# Patient Record
Sex: FEMALE | URBAN - METROPOLITAN AREA
[De-identification: names, ages, dates, MRNs, and addresses within clinical notes are randomized per-mention and may not be internally consistent; named-entity substitution may affect disease eponyms.]

---

## 2022-09-28 ENCOUNTER — OUT OF OFFICE VISIT (OUTPATIENT)
Dept: URBAN - METROPOLITAN AREA MEDICAL CENTER 8 | Facility: MEDICAL CENTER | Age: 77
End: 2022-09-28
Payer: COMMERCIAL

## 2022-09-28 DIAGNOSIS — K92.1 ACUTE MELENA: ICD-10-CM

## 2022-09-28 DIAGNOSIS — K31.89 ACQUIRED DEFORMITY OF DUODENUM: ICD-10-CM

## 2022-09-28 DIAGNOSIS — K22.89 DILATATION OF ESOPHAGUS: ICD-10-CM

## 2022-09-28 DIAGNOSIS — D62 ABLA (ACUTE BLOOD LOSS ANEMIA): ICD-10-CM

## 2022-09-28 DIAGNOSIS — D64.89 ANEMIA DUE TO OTHER CAUSE: ICD-10-CM

## 2022-09-28 PROCEDURE — 99222 1ST HOSP IP/OBS MODERATE 55: CPT | Performed by: INTERNAL MEDICINE

## 2022-09-28 PROCEDURE — 99232 SBSQ HOSP IP/OBS MODERATE 35: CPT | Performed by: INTERNAL MEDICINE

## 2022-09-28 PROCEDURE — G8427 DOCREV CUR MEDS BY ELIG CLIN: HCPCS | Performed by: INTERNAL MEDICINE

## 2022-09-28 PROCEDURE — 43239 EGD BIOPSY SINGLE/MULTIPLE: CPT | Performed by: INTERNAL MEDICINE

## 2023-02-27 ENCOUNTER — OUT OF OFFICE VISIT (OUTPATIENT)
Dept: URBAN - METROPOLITAN AREA MEDICAL CENTER 8 | Facility: MEDICAL CENTER | Age: 78
End: 2023-02-27

## 2023-02-27 ENCOUNTER — CLAIMS CREATED FROM THE CLAIM WINDOW (OUTPATIENT)
Dept: URBAN - METROPOLITAN AREA MEDICAL CENTER 8 | Facility: MEDICAL CENTER | Age: 78
End: 2023-02-27
Payer: COMMERCIAL

## 2023-02-27 DIAGNOSIS — R41.0 ACUTE CONFUSION: ICD-10-CM

## 2023-02-27 DIAGNOSIS — R18.8 ABDOMINAL ASCITES: ICD-10-CM

## 2023-02-27 DIAGNOSIS — K92.1 ACUTE MELENA: ICD-10-CM

## 2023-02-27 DIAGNOSIS — K74.60 ADVANCED CIRRHOSIS: ICD-10-CM

## 2023-02-27 PROCEDURE — 99232 SBSQ HOSP IP/OBS MODERATE 35: CPT | Performed by: INTERNAL MEDICINE

## 2023-03-02 ENCOUNTER — CLAIMS CREATED FROM THE CLAIM WINDOW (OUTPATIENT)
Dept: URBAN - METROPOLITAN AREA MEDICAL CENTER 8 | Facility: MEDICAL CENTER | Age: 78
End: 2023-03-02
Payer: COMMERCIAL

## 2023-03-02 ENCOUNTER — CLAIMS CREATED FROM THE CLAIM WINDOW (OUTPATIENT)
Dept: URBAN - METROPOLITAN AREA MEDICAL CENTER 8 | Facility: MEDICAL CENTER | Age: 78
End: 2023-03-02

## 2023-03-02 DIAGNOSIS — R18.8 ABDOMINAL ASCITES: ICD-10-CM

## 2023-03-02 DIAGNOSIS — K74.60 ADVANCED CIRRHOSIS: ICD-10-CM

## 2023-03-02 DIAGNOSIS — K92.1 ACUTE MELENA: ICD-10-CM

## 2023-03-02 PROCEDURE — 99232 SBSQ HOSP IP/OBS MODERATE 35: CPT | Performed by: INTERNAL MEDICINE

## 2023-04-17 ENCOUNTER — OFFICE VISIT (OUTPATIENT)
Dept: URBAN - METROPOLITAN AREA CLINIC 25 | Facility: CLINIC | Age: 78
End: 2023-04-17

## 2023-04-17 NOTE — HPI-TODAY'S VISIT:
April 2023 visit: Patient had a inpatient EGD in September 22 for melena which revealed LA grade a esophagitis, few gastric antral erosions, small hiatal hernia, gastritis, repeat EGD was advised in 2 to 3 months.  Path from the stomach revealed no H. pylori. Patient was seen in March 2023 following a diagnosis of liver cirrhosis, ascites, pleural effusion.  Viral hepatitis serologies were negative.  Patient did undergo abdominal paracentesis and did not have SBP.  She was started on low-dose Lasix and Aldactone CAT scan of the abdomen and pelvis confirm liver cirrhosis, gallstones, ascites, no liver mass normal alpha-fetoprotein level